# Patient Record
Sex: FEMALE | Race: WHITE | URBAN - METROPOLITAN AREA
[De-identification: names, ages, dates, MRNs, and addresses within clinical notes are randomized per-mention and may not be internally consistent; named-entity substitution may affect disease eponyms.]

---

## 2021-05-26 ENCOUNTER — OFFICE VISIT (OUTPATIENT)
Dept: URBAN - METROPOLITAN AREA CLINIC 74 | Facility: CLINIC | Age: 66
End: 2021-05-26
Payer: COMMERCIAL

## 2021-05-26 ENCOUNTER — DASHBOARD ENCOUNTERS (OUTPATIENT)
Age: 66
End: 2021-05-26

## 2021-05-26 DIAGNOSIS — Z80.0 FAMILY HISTORY OF COLON CANCER: ICD-10-CM

## 2021-05-26 DIAGNOSIS — R10.11 RIGHT UPPER QUADRANT ABDOMINAL PAIN: ICD-10-CM

## 2021-05-26 DIAGNOSIS — K73.9 CHRONIC HEPATITIS: ICD-10-CM

## 2021-05-26 PROBLEM — 76783007: Status: ACTIVE | Noted: 2021-05-26

## 2021-05-26 PROBLEM — 312824007: Status: ACTIVE | Noted: 2021-05-26

## 2021-05-26 PROCEDURE — 99204 OFFICE O/P NEW MOD 45 MIN: CPT | Performed by: STUDENT IN AN ORGANIZED HEALTH CARE EDUCATION/TRAINING PROGRAM

## 2021-05-26 RX ORDER — IBUPROFEN 200 MG/1
1 TABLET WITH FOOD OR MILK AS NEEDED TABLET, FILM COATED ORAL THREE TIMES A DAY
Status: ACTIVE | COMMUNITY

## 2021-05-26 RX ORDER — VALACYCLOVIR HYDROCHLORIDE 1 G/1
1 TABLET TABLET, FILM COATED ORAL ONCE A DAY
Status: ACTIVE | COMMUNITY

## 2021-05-26 NOTE — HPI-TODAY'S VISIT:
65-year-old female New to me Comes in for evaluation of episode of pain which he described to be involving the right side of the chest wall and right side of the abdomen wall.  This was sudden in onset and happened about 4 weeks back and then lasted for few days with gradual improvement over time.  During the episode, pain was significant in intensity and patient reports tenderness.  Pain was more of a sharp character.  There was some involvement of back along with above-described area.  Patient denies any trauma.  Pain also used to worsen with movement.  Patient denies any associated nausea or vomiting.  No significant heartburn or dysphagia.  No relationship to bowel movement or food intake.  Does reports a change in color of the bowels to be more yellow but otherwise denies any blood.  No diarrhea.  No fever or chills.  No jaundice.  No pruritus. Overall good appetite and denies any unintentional weight loss. History of hepatitis C which has never been treated before.  Labs from 1 year back were negative for any abnormality in liver function test.  Likely underlying source of hepatitis C is a blood transfusion in her early days. Last colonoscopy in 2015.  Patient is unsure of the results. Patient reports family history of colon cancer in niece at age 56. Reports 4 to 5 days a week of 2 to 3 glasses of wine.  No smoking.  No marijuana use. No anticoagulation.

## 2021-05-27 LAB
A/G RATIO: 1.7
ALBUMIN: 4.8
ALKALINE PHOSPHATASE: 114
AST (SGOT): 30
BASO (ABSOLUTE): 0
BASOS: 0
BILIRUBIN, TOTAL: 0.5
BUN/CREATININE RATIO: 15
BUN: 11
CALCIUM: 9.7
CARBON DIOXIDE, TOTAL: 22
CHLORIDE: 99
CREATININE: 0.75
EGFR IF AFRICN AM: 97
EGFR IF NONAFRICN AM: 85
EOS (ABSOLUTE): 0.1
EOS: 1
GLOBULIN, TOTAL: 2.9
GLUCOSE: 105
HEMATOCRIT: 43.2
HEMATOLOGY COMMENTS:: (no result)
HEMOGLOBIN: 14.6
IMMATURE CELLS: (no result)
IMMATURE GRANS (ABS): 0
IMMATURE GRANULOCYTES: 0
LIPASE: 22
LYMPHS (ABSOLUTE): 3
LYMPHS: 42
MCH: 31.3
MCHC: 33.8
MCV: 93
MONOCYTES(ABSOLUTE): 0.6
MONOCYTES: 8
NEUTROPHILS (ABSOLUTE): 3.5
NEUTROPHILS: 49
NRBC: (no result)
PLATELETS: 262
POTASSIUM: 4.1
PROTEIN, TOTAL: 7.7
RBC: 4.67
RDW: 12.1
SODIUM: 139
WBC: 7.1

## 2021-06-03 PROBLEM — 301717006: Status: ACTIVE | Noted: 2021-05-27

## 2021-06-07 ENCOUNTER — OFFICE VISIT (OUTPATIENT)
Dept: URBAN - METROPOLITAN AREA CLINIC 73 | Facility: CLINIC | Age: 66
End: 2021-06-07
Payer: COMMERCIAL

## 2021-06-07 DIAGNOSIS — R10.84 ABDOMINAL PAIN, GENERALIZED: ICD-10-CM

## 2021-06-07 DIAGNOSIS — N28.1 KIDNEY CYSTS: ICD-10-CM

## 2021-06-07 PROCEDURE — 76700 US EXAM ABDOM COMPLETE: CPT | Performed by: STUDENT IN AN ORGANIZED HEALTH CARE EDUCATION/TRAINING PROGRAM

## 2021-06-07 RX ORDER — IBUPROFEN 200 MG/1
1 TABLET WITH FOOD OR MILK AS NEEDED TABLET, FILM COATED ORAL THREE TIMES A DAY
Status: ACTIVE | COMMUNITY

## 2021-06-07 RX ORDER — VALACYCLOVIR HYDROCHLORIDE 1 G/1
1 TABLET TABLET, FILM COATED ORAL ONCE A DAY
Status: ACTIVE | COMMUNITY

## 2021-06-21 ENCOUNTER — OFFICE VISIT (OUTPATIENT)
Dept: URBAN - METROPOLITAN AREA CLINIC 73 | Facility: CLINIC | Age: 66
End: 2021-06-21

## 2021-07-01 ENCOUNTER — OFFICE VISIT (OUTPATIENT)
Dept: URBAN - METROPOLITAN AREA SURGERY CENTER 30 | Facility: SURGERY CENTER | Age: 66
End: 2021-07-01

## 2021-07-19 ENCOUNTER — OFFICE VISIT (OUTPATIENT)
Dept: URBAN - METROPOLITAN AREA CLINIC 74 | Facility: CLINIC | Age: 66
End: 2021-07-19